# Patient Record
Sex: MALE | Race: WHITE
[De-identification: names, ages, dates, MRNs, and addresses within clinical notes are randomized per-mention and may not be internally consistent; named-entity substitution may affect disease eponyms.]

---

## 2019-04-15 ENCOUNTER — HOSPITAL ENCOUNTER (OUTPATIENT)
Dept: HOSPITAL 62 - RAD | Age: 34
End: 2019-04-15
Attending: STUDENT IN AN ORGANIZED HEALTH CARE EDUCATION/TRAINING PROGRAM
Payer: OTHER GOVERNMENT

## 2019-04-15 DIAGNOSIS — I70.1: Primary | ICD-10-CM

## 2019-04-15 PROCEDURE — 93975 VASCULAR STUDY: CPT

## 2019-04-15 NOTE — RADIOLOGY REPORT (SQ)
EXAM DESCRIPTION:  DUPLEX ART/LOWELL FLOW COMPLETE



COMPLETED DATE/TIME:  4/15/2019 8:20 am



REASON FOR STUDY:  LAQUITA (I70.1) I70.1  ATHEROSCLEROSIS OF RENAL ARTERY



COMPARISON:  None.



TECHNIQUE:  Realtime and static grayscale images acquired. Selected color Doppler, velocities and spe
ctral images recorded.



LIMITATIONS:  None.



FINDINGS:  RIGHT KIDNEY:

RENAL ARTERY VELOCITIES: 57 cm/sec.  Segmental artery velocity 39 cm/sec.

RENAL VEIN:  Color doppler flow present, patent.

VELOCITY RATIO: 0.63.  Normal waveforms.

KIDNEY:  Normal size.   No significant pathology.

LEFT KIDNEY:

RENAL ARTERY VELOCITIES: 119 cm/sec.  Segmental artery velocity 57 cm/sec.

RENAL VEIN:  Color doppler flow present, patent.

VELOCITY RATIO: 1.43. Normal waveforms.

KIDNEY:  Normal size.   No significant pathology.

BLADDER: Normal.

OTHER: No other significant finding.



IMPRESSION:  NO DOPPLER EVIDENCE OF HEMODYNAMICALLY SIGNIFICANT RENAL ARTERY STENOSIS.



COMMENT:  NORMAL RENAL ARTERY/AORTA VELOCITY RATIO IS LESS THAN OR EQUAL TO 3.5.



TECHNICAL DOCUMENTATION:  JOB ID:  [id number]

 2011 [employer]- All Rights Reserved



Reading location - IP/workstation name: [email or web address]